# Patient Record
Sex: MALE | Race: WHITE | NOT HISPANIC OR LATINO | Employment: FULL TIME | URBAN - METROPOLITAN AREA
[De-identification: names, ages, dates, MRNs, and addresses within clinical notes are randomized per-mention and may not be internally consistent; named-entity substitution may affect disease eponyms.]

---

## 2018-01-05 ENCOUNTER — HOSPITAL ENCOUNTER (EMERGENCY)
Facility: OTHER | Age: 43
Discharge: HOME | End: 2018-01-05
Attending: EMERGENCY MEDICINE
Payer: COMMERCIAL

## 2018-01-05 VITALS
BODY MASS INDEX: 27.09 KG/M2 | WEIGHT: 200 LBS | OXYGEN SATURATION: 100 % | HEART RATE: 70 BPM | RESPIRATION RATE: 18 BRPM | TEMPERATURE: 98 F | SYSTOLIC BLOOD PRESSURE: 133 MMHG | HEIGHT: 72 IN | DIASTOLIC BLOOD PRESSURE: 58 MMHG

## 2018-01-05 DIAGNOSIS — T15.02XA FOREIGN BODY OF LEFT CORNEA, INITIAL ENCOUNTER: Primary | ICD-10-CM

## 2018-01-05 PROCEDURE — 65222 REMOVE FOREIGN BODY FROM EYE: CPT

## 2018-01-05 PROCEDURE — 25000003 PHARM REV CODE 250: Performed by: EMERGENCY MEDICINE

## 2018-01-05 PROCEDURE — 99283 EMERGENCY DEPT VISIT LOW MDM: CPT | Mod: 25

## 2018-01-05 RX ORDER — GENTAMICIN SULFATE 0.3 %
0.5 OINTMENT (GRAM) OPHTHALMIC (EYE) 2 TIMES DAILY
Qty: 2 TUBE | Refills: 0 | Status: SHIPPED | OUTPATIENT
Start: 2018-01-05 | End: 2018-01-12

## 2018-01-05 RX ORDER — TRAMADOL HYDROCHLORIDE 50 MG/1
50 TABLET ORAL EVERY 6 HOURS PRN
Qty: 12 TABLET | Refills: 0 | Status: SHIPPED | OUTPATIENT
Start: 2018-01-05 | End: 2018-01-15

## 2018-01-05 RX ORDER — PROPARACAINE HYDROCHLORIDE 5 MG/ML
1 SOLUTION/ DROPS OPHTHALMIC
Status: COMPLETED | OUTPATIENT
Start: 2018-01-05 | End: 2018-01-05

## 2018-01-05 RX ADMIN — FLUORESCEIN SODIUM 1 STRIP: 1 STRIP OPHTHALMIC at 03:01

## 2018-01-05 RX ADMIN — PROPARACAINE HYDROCHLORIDE 1 DROP: 5 SOLUTION/ DROPS OPHTHALMIC at 03:01

## 2018-01-05 NOTE — ED PROVIDER NOTES
Encounter Date: 1/5/2018       History     Chief Complaint   Patient presents with    Foreign Body in Eye     reports foreign body either metal or stephanie to L eye     The history is provided by the patient.   Foreign Body in Eye   This is a new problem. The current episode started 12 to 24 hours ago. The problem occurs constantly. The problem has not changed since onset.Pertinent negatives include no chest pain, no headaches and no shortness of breath. Nothing aggravates the symptoms. Nothing relieves the symptoms. He has tried nothing for the symptoms.     Review of patient's allergies indicates:  No Known Allergies  History reviewed. No pertinent past medical history.  History reviewed. No pertinent surgical history.  History reviewed. No pertinent family history.  Social History   Substance Use Topics    Smoking status: Never Smoker    Smokeless tobacco: Former User    Alcohol use No     Review of Systems   Constitutional: Negative.    HENT: Negative.    Eyes: Positive for pain and redness.   Respiratory: Negative.  Negative for shortness of breath.    Cardiovascular: Negative.  Negative for chest pain.   Gastrointestinal: Negative.    Endocrine: Negative.    Genitourinary: Negative.    Musculoskeletal: Negative.    Skin: Negative.    Allergic/Immunologic: Negative.    Neurological: Negative.  Negative for headaches.   Hematological: Negative.    Psychiatric/Behavioral: Negative.    All other systems reviewed and are negative.      Physical Exam     Initial Vitals [01/05/18 1455]   BP Pulse Resp Temp SpO2   (!) 133/58 70 18 97.7 °F (36.5 °C) 100 %      MAP       83         Physical Exam    Nursing note and vitals reviewed.  Constitutional: Vital signs are normal. He appears well-developed. He is active and cooperative.   HENT:   Head: Normocephalic and atraumatic.   Eyes: Conjunctivae and EOM are normal. Pupils are equal, round, and reactive to light. Foreign body present in the left eye.       Neck: Trachea  normal and full passive range of motion without pain. Neck supple. No thyroid mass present.   Cardiovascular: Normal rate, regular rhythm, S1 normal, S2 normal, normal heart sounds, intact distal pulses and normal pulses.   Abdominal: Soft. Normal appearance, normal aorta and bowel sounds are normal.   Musculoskeletal: Normal range of motion.   Lymphadenopathy:     He has no axillary adenopathy.   Neurological: He is alert and oriented to person, place, and time.   Skin: Skin is warm, dry and intact.   Psychiatric: He has a normal mood and affect. His speech is normal and behavior is normal. Judgment and thought content normal. Cognition and memory are normal.         ED Course   Foreign Body  Date/Time: 1/5/2018 3:37 PM  Performed by: BRYANT MO.  Authorized by: BRYANT MO.   Consent Done: Not Needed  Body area: eye  Location details: left cornea  Anesthesia: local infiltration    Anesthesia:  Local Anesthetic: proparacaine drops  Patient sedated: no  Patient restrained: no  Localization method: slit lamp  Removal mechanism: ophthalmic margarito  Eye examined with fluorescein.  Fluorescein uptake.  Corneal abrasion size: small  Corneal abrasion location: medial  Residual rust ring present.  Depth: superficial  Complexity: simple  1 objects recovered.  Objects recovered: Metal foreign body  Post-procedure assessment: foreign body removed  Patient tolerance: Patient tolerated the procedure well with no immediate complications  Comments: Residual rust ring      Labs Reviewed - No data to display                            ED Course      Clinical Impression:   The encounter diagnosis was Foreign body of left cornea, initial encounter.                           Bryant Mo MD  01/05/18 1886

## 2018-01-05 NOTE — ED NOTES
Dr. Cee at bedside to evaluate foreign body to pt's left eye. SLE performed and metal piece of debris drilled out by EDMD with bovie margarito. Pt to have eye irrigated with normal saline prior to discharge. Pt tolerated procedure well, will continue to monitor.